# Patient Record
Sex: FEMALE | Race: WHITE | NOT HISPANIC OR LATINO
[De-identification: names, ages, dates, MRNs, and addresses within clinical notes are randomized per-mention and may not be internally consistent; named-entity substitution may affect disease eponyms.]

---

## 2017-01-03 ENCOUNTER — RX RENEWAL (OUTPATIENT)
Age: 57
End: 2017-01-03

## 2017-03-20 ENCOUNTER — RX RENEWAL (OUTPATIENT)
Age: 57
End: 2017-03-20

## 2017-03-29 ENCOUNTER — MEDICATION RENEWAL (OUTPATIENT)
Age: 57
End: 2017-03-29

## 2017-06-12 ENCOUNTER — NON-APPOINTMENT (OUTPATIENT)
Age: 57
End: 2017-06-12

## 2017-06-12 ENCOUNTER — APPOINTMENT (OUTPATIENT)
Dept: CARDIOLOGY | Facility: CLINIC | Age: 57
End: 2017-06-12

## 2017-06-12 VITALS
OXYGEN SATURATION: 97 % | HEART RATE: 84 BPM | BODY MASS INDEX: 44.98 KG/M2 | WEIGHT: 270 LBS | SYSTOLIC BLOOD PRESSURE: 106 MMHG | HEIGHT: 65 IN | DIASTOLIC BLOOD PRESSURE: 73 MMHG

## 2017-06-12 DIAGNOSIS — I49.1 ATRIAL PREMATURE DEPOLARIZATION: ICD-10-CM

## 2017-06-17 ENCOUNTER — RESULT CHARGE (OUTPATIENT)
Age: 57
End: 2017-06-17

## 2018-06-06 ENCOUNTER — RX RENEWAL (OUTPATIENT)
Age: 58
End: 2018-06-06

## 2019-05-13 ENCOUNTER — MEDICATION RENEWAL (OUTPATIENT)
Age: 59
End: 2019-05-13

## 2019-06-07 ENCOUNTER — RX RENEWAL (OUTPATIENT)
Age: 59
End: 2019-06-07

## 2019-07-01 ENCOUNTER — APPOINTMENT (OUTPATIENT)
Dept: CARDIOLOGY | Facility: CLINIC | Age: 59
End: 2019-07-01
Payer: COMMERCIAL

## 2019-07-01 ENCOUNTER — NON-APPOINTMENT (OUTPATIENT)
Age: 59
End: 2019-07-01

## 2019-07-01 VITALS
BODY MASS INDEX: 42.49 KG/M2 | HEIGHT: 65 IN | HEART RATE: 69 BPM | OXYGEN SATURATION: 98 % | WEIGHT: 255 LBS | DIASTOLIC BLOOD PRESSURE: 76 MMHG | SYSTOLIC BLOOD PRESSURE: 120 MMHG

## 2019-07-01 PROCEDURE — 99214 OFFICE O/P EST MOD 30 MIN: CPT

## 2019-07-01 PROCEDURE — 93000 ELECTROCARDIOGRAM COMPLETE: CPT

## 2019-11-04 ENCOUNTER — RX RENEWAL (OUTPATIENT)
Age: 59
End: 2019-11-04

## 2019-11-05 ENCOUNTER — RX RENEWAL (OUTPATIENT)
Age: 59
End: 2019-11-05

## 2020-01-28 ENCOUNTER — RX RENEWAL (OUTPATIENT)
Age: 60
End: 2020-01-28

## 2020-04-28 ENCOUNTER — RX RENEWAL (OUTPATIENT)
Age: 60
End: 2020-04-28

## 2020-07-27 ENCOUNTER — RX RENEWAL (OUTPATIENT)
Age: 60
End: 2020-07-27

## 2020-08-07 ENCOUNTER — RX RENEWAL (OUTPATIENT)
Age: 60
End: 2020-08-07

## 2020-10-01 ENCOUNTER — NON-APPOINTMENT (OUTPATIENT)
Age: 60
End: 2020-10-01

## 2020-10-01 ENCOUNTER — APPOINTMENT (OUTPATIENT)
Dept: CARDIOLOGY | Facility: CLINIC | Age: 60
End: 2020-10-01
Payer: COMMERCIAL

## 2020-10-01 VITALS
SYSTOLIC BLOOD PRESSURE: 149 MMHG | HEART RATE: 58 BPM | OXYGEN SATURATION: 98 % | BODY MASS INDEX: 40.98 KG/M2 | DIASTOLIC BLOOD PRESSURE: 98 MMHG | WEIGHT: 246 LBS | HEIGHT: 65 IN

## 2020-10-01 DIAGNOSIS — R00.2 PALPITATIONS: ICD-10-CM

## 2020-10-01 PROCEDURE — 93000 ELECTROCARDIOGRAM COMPLETE: CPT

## 2020-10-01 PROCEDURE — 99214 OFFICE O/P EST MOD 30 MIN: CPT

## 2020-10-01 RX ORDER — HYDROCHLOROTHIAZIDE 12.5 MG/1
12.5 TABLET ORAL DAILY
Qty: 90 | Refills: 0 | Status: DISCONTINUED | COMMUNITY
Start: 2019-05-13 | End: 2020-10-01

## 2020-10-01 RX ORDER — LOSARTAN POTASSIUM 50 MG/1
50 TABLET, FILM COATED ORAL DAILY
Qty: 90 | Refills: 1 | Status: DISCONTINUED | COMMUNITY
Start: 2019-05-13 | End: 2020-10-01

## 2020-10-17 ENCOUNTER — RESULT CHARGE (OUTPATIENT)
Age: 60
End: 2020-10-17

## 2020-10-26 ENCOUNTER — RX RENEWAL (OUTPATIENT)
Age: 60
End: 2020-10-26

## 2020-11-02 ENCOUNTER — RX RENEWAL (OUTPATIENT)
Age: 60
End: 2020-11-02

## 2021-11-01 ENCOUNTER — RX RENEWAL (OUTPATIENT)
Age: 61
End: 2021-11-01

## 2022-01-26 ENCOUNTER — RX RENEWAL (OUTPATIENT)
Age: 62
End: 2022-01-26

## 2022-04-25 ENCOUNTER — RX RENEWAL (OUTPATIENT)
Age: 62
End: 2022-04-25

## 2022-04-26 ENCOUNTER — NON-APPOINTMENT (OUTPATIENT)
Age: 62
End: 2022-04-26

## 2022-07-22 ENCOUNTER — RX RENEWAL (OUTPATIENT)
Age: 62
End: 2022-07-22

## 2022-07-25 ENCOUNTER — RX RENEWAL (OUTPATIENT)
Age: 62
End: 2022-07-25

## 2022-07-26 ENCOUNTER — NON-APPOINTMENT (OUTPATIENT)
Age: 62
End: 2022-07-26

## 2023-09-05 ENCOUNTER — APPOINTMENT (OUTPATIENT)
Dept: MRI IMAGING | Facility: CLINIC | Age: 63
End: 2023-09-05
Payer: MEDICARE

## 2023-09-05 ENCOUNTER — APPOINTMENT (OUTPATIENT)
Dept: ORTHOPEDIC SURGERY | Facility: CLINIC | Age: 63
End: 2023-09-05
Payer: MEDICARE

## 2023-09-05 VITALS — BODY MASS INDEX: 41.65 KG/M2 | HEIGHT: 65 IN | WEIGHT: 250 LBS

## 2023-09-05 DIAGNOSIS — J45.909 UNSPECIFIED ASTHMA, UNCOMPLICATED: ICD-10-CM

## 2023-09-05 DIAGNOSIS — Z86.79 PERSONAL HISTORY OF OTHER DISEASES OF THE CIRCULATORY SYSTEM: ICD-10-CM

## 2023-09-05 PROCEDURE — 72100 X-RAY EXAM L-S SPINE 2/3 VWS: CPT

## 2023-09-05 PROCEDURE — 72148 MRI LUMBAR SPINE W/O DYE: CPT | Mod: MH

## 2023-09-05 PROCEDURE — 99203 OFFICE O/P NEW LOW 30 MIN: CPT | Mod: 25

## 2023-09-05 NOTE — IMAGING
[de-identified] : X-ray Ap/Lateral of lumbar spine were viewed and interpreted.  Normal alignment is maintained without any spondylolisthesis.  L4-5 and L5-S1 disc height loss.   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  L5-s1 broad bulge with asymmetric to left herniation.

## 2023-09-05 NOTE — HISTORY OF PRESENT ILLNESS
[de-identified] : 61 year old female presents today with complaints of Right sided low back pain and spasm with Right radicular pain to the lateral leg to the ankle. Patient reports RLE heaviness with ambulation and reports right foot subjective weakness. Patient reports symptoms started after sitting for some time, denies acute trauma/injury. Patient went to  given flexeril and Prednisone tapler with minimal relief, She then f/u with her PMD given diclofenac which she continues to take with minimal relief. Patient denies change in b/b function.  Patient reports hx of left sided low back pain, 6 years ago, with residual LLE numbness. Managed with PT and LESI x 1.  PmHx: HTN, Hx of Breast Ca s/p lumpectomy, RT (2008, now SUKUMAR) Occupation: Retired,

## 2023-09-05 NOTE — PHYSICAL EXAM
[Extension] : extension [5___] : right plantor flexors 5[unfilled]/5 [4___] : right extensor hallicus longus 4[unfilled]/5 [] : negative sitting straight leg raise

## 2023-09-05 NOTE — ASSESSMENT
[FreeTextEntry1] : 63 F with lbp and RLE radic.  Pain imrpoved with MDP and NSAIDS but peristente RLE weakness.   MRI L spine Fu after MRI  Discussed PT, vs GIANA, vs surgery depending on strength and progression of symptoms.

## 2023-09-12 ENCOUNTER — APPOINTMENT (OUTPATIENT)
Dept: ORTHOPEDIC SURGERY | Facility: CLINIC | Age: 63
End: 2023-09-12
Payer: MEDICARE

## 2023-09-12 VITALS — WEIGHT: 250 LBS | HEIGHT: 65 IN | BODY MASS INDEX: 41.65 KG/M2

## 2023-09-12 PROCEDURE — 99213 OFFICE O/P EST LOW 20 MIN: CPT

## 2023-09-14 ENCOUNTER — APPOINTMENT (OUTPATIENT)
Dept: PAIN MANAGEMENT | Facility: CLINIC | Age: 63
End: 2023-09-14
Payer: MEDICARE

## 2023-09-14 VITALS — BODY MASS INDEX: 42.99 KG/M2 | WEIGHT: 258 LBS | HEIGHT: 65 IN

## 2023-09-14 PROCEDURE — 99204 OFFICE O/P NEW MOD 45 MIN: CPT

## 2023-09-14 RX ORDER — DIAZEPAM 2 MG/1
2 TABLET ORAL
Qty: 2 | Refills: 0 | Status: ACTIVE | COMMUNITY
Start: 2023-09-14 | End: 1900-01-01

## 2023-09-29 ENCOUNTER — APPOINTMENT (OUTPATIENT)
Dept: PAIN MANAGEMENT | Facility: CLINIC | Age: 63
End: 2023-09-29
Payer: MEDICARE

## 2023-09-29 PROCEDURE — 62323 NJX INTERLAMINAR LMBR/SAC: CPT

## 2023-10-10 ENCOUNTER — APPOINTMENT (OUTPATIENT)
Dept: ORTHOPEDIC SURGERY | Facility: CLINIC | Age: 63
End: 2023-10-10
Payer: MEDICARE

## 2023-10-10 VITALS — BODY MASS INDEX: 42.99 KG/M2 | HEIGHT: 65 IN | WEIGHT: 258 LBS

## 2023-10-10 DIAGNOSIS — Z87.39 PERSONAL HISTORY OF OTHER DISEASES OF THE MUSCULOSKELETAL SYSTEM AND CONNECTIVE TISSUE: ICD-10-CM

## 2023-10-10 PROCEDURE — 99213 OFFICE O/P EST LOW 20 MIN: CPT

## 2023-10-12 ENCOUNTER — APPOINTMENT (OUTPATIENT)
Dept: PAIN MANAGEMENT | Facility: CLINIC | Age: 63
End: 2023-10-12
Payer: MEDICARE

## 2023-10-12 VITALS — BODY MASS INDEX: 43.32 KG/M2 | HEIGHT: 65 IN | WEIGHT: 260 LBS

## 2023-10-12 DIAGNOSIS — M51.36 OTHER INTERVERTEBRAL DISC DEGENERATION, LUMBAR REGION: ICD-10-CM

## 2023-10-12 DIAGNOSIS — M51.16 INTERVERTEBRAL DISC DISORDERS WITH RADICULOPATHY, LUMBAR REGION: ICD-10-CM

## 2023-10-12 DIAGNOSIS — M54.16 RADICULOPATHY, LUMBAR REGION: ICD-10-CM

## 2023-10-12 PROCEDURE — 99214 OFFICE O/P EST MOD 30 MIN: CPT

## 2023-11-06 ENCOUNTER — RX RENEWAL (OUTPATIENT)
Age: 63
End: 2023-11-06

## 2023-12-12 ENCOUNTER — APPOINTMENT (OUTPATIENT)
Dept: ORTHOPEDIC SURGERY | Facility: CLINIC | Age: 63
End: 2023-12-12

## 2024-02-05 ENCOUNTER — RX RENEWAL (OUTPATIENT)
Age: 64
End: 2024-02-05

## 2024-02-05 RX ORDER — MELOXICAM 15 MG/1
15 TABLET ORAL DAILY
Qty: 30 | Refills: 1 | Status: ACTIVE | COMMUNITY
Start: 2023-09-12 | End: 1900-01-01